# Patient Record
Sex: FEMALE | Race: WHITE
[De-identification: names, ages, dates, MRNs, and addresses within clinical notes are randomized per-mention and may not be internally consistent; named-entity substitution may affect disease eponyms.]

---

## 2023-05-21 ENCOUNTER — HOSPITAL ENCOUNTER (EMERGENCY)
Dept: HOSPITAL 26 - MED | Age: 58
Discharge: HOME | End: 2023-05-21
Payer: COMMERCIAL

## 2023-05-21 VITALS — WEIGHT: 155.04 LBS | HEIGHT: 60 IN | BODY MASS INDEX: 30.44 KG/M2

## 2023-05-21 VITALS — DIASTOLIC BLOOD PRESSURE: 92 MMHG | SYSTOLIC BLOOD PRESSURE: 154 MMHG

## 2023-05-21 DIAGNOSIS — B97.89: ICD-10-CM

## 2023-05-21 DIAGNOSIS — J02.8: Primary | ICD-10-CM

## 2023-05-21 PROCEDURE — 96372 THER/PROPH/DIAG INJ SC/IM: CPT

## 2023-05-21 PROCEDURE — 99283 EMERGENCY DEPT VISIT LOW MDM: CPT

## 2023-05-21 PROCEDURE — 87081 CULTURE SCREEN ONLY: CPT

## 2023-05-21 NOTE — NUR
Patient discharged with v/s stable. Written and verbal after care instructions 
given and explained.  New rx methyprednisone. 

Patient verbalized understanding. Ambulatory with steady gait. All questions 
addressed prior to discharge. Advised to follow up with PMD.